# Patient Record
Sex: MALE | Race: WHITE | NOT HISPANIC OR LATINO | Employment: FULL TIME | URBAN - METROPOLITAN AREA
[De-identification: names, ages, dates, MRNs, and addresses within clinical notes are randomized per-mention and may not be internally consistent; named-entity substitution may affect disease eponyms.]

---

## 2024-02-21 ENCOUNTER — LAB (OUTPATIENT)
Dept: LAB | Facility: HOSPITAL | Age: 24
End: 2024-02-21
Payer: MEDICAID

## 2024-02-21 ENCOUNTER — OFFICE VISIT (OUTPATIENT)
Dept: FAMILY MEDICINE CLINIC | Facility: CLINIC | Age: 24
End: 2024-02-21
Payer: MEDICAID

## 2024-02-21 VITALS
TEMPERATURE: 97.6 F | BODY MASS INDEX: 21.92 KG/M2 | WEIGHT: 180 LBS | SYSTOLIC BLOOD PRESSURE: 110 MMHG | DIASTOLIC BLOOD PRESSURE: 72 MMHG | OXYGEN SATURATION: 98 % | HEIGHT: 76 IN | HEART RATE: 53 BPM

## 2024-02-21 DIAGNOSIS — Z23 IMMUNIZATION DUE: ICD-10-CM

## 2024-02-21 DIAGNOSIS — J02.9 ACUTE PHARYNGITIS, UNSPECIFIED ETIOLOGY: ICD-10-CM

## 2024-02-21 DIAGNOSIS — J02.9 ACUTE PHARYNGITIS, UNSPECIFIED ETIOLOGY: Primary | ICD-10-CM

## 2024-02-21 LAB
DEPRECATED RDW RBC AUTO: 42.9 FL (ref 37–54)
ERYTHROCYTE [DISTWIDTH] IN BLOOD BY AUTOMATED COUNT: 13.2 % (ref 12.3–15.4)
HCT VFR BLD AUTO: 45.6 % (ref 37.5–51)
HGB BLD-MCNC: 15.7 G/DL (ref 13–17.7)
MCH RBC QN AUTO: 30.5 PG (ref 26.6–33)
MCHC RBC AUTO-ENTMCNC: 34.4 G/DL (ref 31.5–35.7)
MCV RBC AUTO: 88.7 FL (ref 79–97)
PLATELET # BLD AUTO: 259 10*3/MM3 (ref 140–450)
PMV BLD AUTO: 11.3 FL (ref 6–12)
RBC # BLD AUTO: 5.14 10*6/MM3 (ref 4.14–5.8)
WBC NRBC COR # BLD AUTO: 6.83 10*3/MM3 (ref 3.4–10.8)

## 2024-02-21 PROCEDURE — 99204 OFFICE O/P NEW MOD 45 MIN: CPT

## 2024-02-21 PROCEDURE — 87081 CULTURE SCREEN ONLY: CPT

## 2024-02-21 PROCEDURE — 85027 COMPLETE CBC AUTOMATED: CPT

## 2024-02-21 PROCEDURE — 36415 COLL VENOUS BLD VENIPUNCTURE: CPT

## 2024-02-21 RX ORDER — AMOXICILLIN 875 MG/1
875 TABLET, COATED ORAL 2 TIMES DAILY
Qty: 14 TABLET | Refills: 0 | Status: SHIPPED | OUTPATIENT
Start: 2024-02-21 | End: 2024-02-28

## 2024-02-21 NOTE — PROGRESS NOTES
"     New Patient Office Visit      Date: 2024   Patient Name: Aren Vital  : 2000   MRN: [id number]     Chief Complaint:    Chief Complaint   Patient presents with    Sore Throat     Sores in his throat for one week. Has had strep, covid, RSV, STD testing and has all came back negative this past Friday.      Earache     Earache for one week.       History of Present Illness: Aren Vital is a 23 y.o. male who is here today to establish care.  Patient has no pertinent past medical history.  Patient currently living here in [place] and works for a [employer].  He is currently expecting his first child with his significant other.  Patient states that since last Wednesday he has had a sore throat.  Patient went to the ER this past Saturday and tested negative for COVID, flu, strep, chlamydia, gonorrhea, mono.  Patient was told to treat with supportive measures like salt water lavage and ibuprofen for pain.  It was also recommended to use benzocaine lozenges.  Patient states that originally he had big white dots on the back right side of his throat.  He had his tonsils removed as a child.  He states he has not had a fever, body ache, cough, chills.  Patient states that all the dots have turned into 1 long strand and look like a \"long skinny white patch.\"  Patient states that benzocaine lozenges have helped with this pain.  He does not like to take ibuprofen or Tylenol if he does not need to.  Patient states that he wanted to get checked out again as his symptoms have persisted.  Patient states he is also noticed his ear started to ache in the past 2 days.  He would like it looked at.  He denies any ringing, dizziness.        Patient states he has not had vaccines since he was 11 or 12 years old.  He states he knows he is due for some I would like to discuss the HPV and tetanus boosters today.  He has never received the HPV vaccine before.    Subjective      Review of Systems:   Review of Systems " "  Constitutional:  Negative for chills and fever.   HENT:  Positive for ear pain and sore throat.    Eyes:  Negative for visual disturbance.   Respiratory:  Negative for chest tightness and shortness of breath.    Cardiovascular:  Negative for chest pain and palpitations.   Gastrointestinal:  Negative for abdominal pain.       Past Medical History: History reviewed. No pertinent past medical history.    Past Surgical History: History reviewed. No pertinent surgical history.    Family History: History reviewed. No pertinent family history.    Social History:   Social History     Socioeconomic History    Marital status: Single   Tobacco Use    Smoking status: Never    Smokeless tobacco: Never   Vaping Use    Vaping Use: Every day    Substances: Nicotine   Substance and Sexual Activity    Alcohol use: Yes     Alcohol/week: 1.0 standard drink of alcohol     Types: 1 Glasses of wine per week     Comment: Very rarely    Drug use: Yes     Types: Marijuana       Medications:     Current Outpatient Medications:     benzocaine-menthol (CHLORASEPTIC) 6-10 MG lozenge, Dissolve 1 each in the mouth., Disp: , Rfl:     amoxicillin (AMOXIL) 875 MG tablet, Take 1 tablet by mouth 2 (Two) Times a Day for 7 days., Disp: 14 tablet, Rfl: 0    Allergies:   No Known Allergies    Objective     Physical Exam:  Vital Signs:   Vitals:    02/21/24 1517   BP: 110/72   Pulse: 53   Temp: 97.6 °F (36.4 °C)   TempSrc: Oral   SpO2: 98%   Weight: 81.6 kg (180 lb)   Height: 191.8 cm (75.5\")     Body mass index is 22.2 kg/m².  BMI is within normal parameters. No other follow-up for BMI required.       Physical Exam  Vitals reviewed.   Constitutional:       Appearance: Normal appearance.   HENT:      Head: Normocephalic and atraumatic.      Right Ear: Ear canal and external ear normal.      Left Ear: Ear canal and external ear normal.      Nose: Nose normal.      Mouth/Throat:      Mouth: Mucous membranes are moist.      Pharynx: Uvula midline. " Oropharyngeal exudate and posterior oropharyngeal erythema present. No pharyngeal swelling or uvula swelling.        Comments: Erythema and 1 thin, long white patch on right posterior throat.  No tonsils present.  No swelling.    Eyes:      Conjunctiva/sclera: Conjunctivae normal.      Pupils: Pupils are equal, round, and reactive to light.   Cardiovascular:      Rate and Rhythm: Normal rate and regular rhythm.      Pulses: Normal pulses.      Heart sounds: Normal heart sounds.   Pulmonary:      Effort: Pulmonary effort is normal.      Breath sounds: Normal breath sounds.   Abdominal:      General: Abdomen is flat. Bowel sounds are normal.   Lymphadenopathy:      Cervical: No cervical adenopathy.   Skin:     General: Skin is warm.   Neurological:      General: No focal deficit present.      Mental Status: He is alert and oriented to person, place, and time.   Psychiatric:         Mood and Affect: Mood normal.         Results:   PHQ-9 Total Score: 0          Assessment / Plan      Assessment/Plan:   Diagnoses and all orders for this visit:    1. Acute pharyngitis, unspecified etiology (Primary)  Assessment & Plan:  With symptoms persisting for 1 week would like to start amoxicillin.  Have ordered throat culture.  Will notify patient of results.  If your symptoms worsen or persist and do not improve would recommend returning to clinic.  He verbalized understanding and agreed to this plan.    Orders:  -     Beta Strep Culture, Throat - Swab, Throat; Future  -     Beta Strep Culture, Throat - Swab, Throat  -     amoxicillin (AMOXIL) 875 MG tablet; Take 1 tablet by mouth 2 (Two) Times a Day for 7 days.  Dispense: 14 tablet; Refill: 0  -     Cancel: CBC (No Diff); Future  -     Cancel: Comprehensive Metabolic Panel; Future  -     CBC w MANUAL Differential; Future    2. Immunization due  Assessment & Plan:  Discussed vaccines patient is due for.  Since patient is sick he would like to come back next week sometime to  receive his HPV and tetanus vaccines           Follow Up:   Return if symptoms worsen or fail to improve.    Liza Smith PA-C   [facility]

## 2024-02-21 NOTE — ASSESSMENT & PLAN NOTE
Discussed vaccines patient is due for.  Since patient is sick he would like to come back next week sometime to receive his HPV and tetanus vaccines

## 2024-02-21 NOTE — ASSESSMENT & PLAN NOTE
With symptoms persisting for 1 week would like to start amoxicillin.  Have ordered throat culture.  Will notify patient of results.  If your symptoms worsen or persist and do not improve would recommend returning to clinic.  He verbalized understanding and agreed to this plan.

## 2024-02-22 DIAGNOSIS — J02.9 ACUTE PHARYNGITIS, UNSPECIFIED ETIOLOGY: Primary | ICD-10-CM

## 2024-02-22 LAB
BASOPHILS # BLD MANUAL: 0.07 10*3/MM3 (ref 0–0.2)
BASOPHILS NFR BLD MANUAL: 1 % (ref 0–1.5)
BURR CELLS BLD QL SMEAR: ABNORMAL
DACRYOCYTES BLD QL SMEAR: ABNORMAL
EOSINOPHIL # BLD MANUAL: 0.2 10*3/MM3 (ref 0–0.4)
EOSINOPHIL NFR BLD MANUAL: 3 % (ref 0.3–6.2)
LYMPHOCYTES # BLD MANUAL: 3.35 10*3/MM3 (ref 0.7–3.1)
LYMPHOCYTES NFR BLD MANUAL: 5 % (ref 5–12)
MONOCYTES # BLD: 0.34 10*3/MM3 (ref 0.1–0.9)
NEUTROPHILS # BLD AUTO: 2.87 10*3/MM3 (ref 1.7–7)
NEUTROPHILS NFR BLD MANUAL: 42 % (ref 42.7–76)
POIKILOCYTOSIS BLD QL SMEAR: ABNORMAL
SMALL PLATELETS BLD QL SMEAR: ADEQUATE
TARGETS BLD QL SMEAR: ABNORMAL
VARIANT LYMPHS NFR BLD MANUAL: 49 % (ref 19.6–45.3)
WBC MORPH BLD: NORMAL

## 2024-02-23 ENCOUNTER — TELEPHONE (OUTPATIENT)
Dept: FAMILY MEDICINE CLINIC | Facility: CLINIC | Age: 24
End: 2024-02-23
Payer: COMMERCIAL

## 2024-02-23 LAB — BACTERIA SPEC AEROBE CULT: NORMAL

## 2024-02-23 NOTE — TELEPHONE ENCOUNTER
Discussed with patient and his test results.  Told him that he likely has a viral illness since his strep throat culture came back negative.  Patient states he is feeling a little bit better, but has not looked at the white patch on his throat if it has gotten smaller.  He told patient that if he continues to have symptoms into next week likely will refer to ENT.  Patient can discontinue amoxicillin at this point.

## 2024-02-23 NOTE — TELEPHONE ENCOUNTER
Name: Aren Vital    Relationship: Self    Best Callback Number: [phone]     HUB PROVIDED THE RELAY MESSAGE FROM THE OFFICE   PATIENT HAS FURTHER QUESTIONS AND WOULD LIKE A CALL BACK AT THE FOLLOWING PHONE NUMBER [phone]    ADDITIONAL INFORMATION:   ASKING WHAT HE CAN DO TO MANAGE THIS. PLEASE ADVISE

## 2024-02-23 NOTE — TELEPHONE ENCOUNTER
----- Message from Liza Smith PA-C sent at 2/23/2024  1:33 PM EST -----    Your throat culture is still negative for strep.  You can stop taking the antibiotic.  This is very likely a viral illness as the ER had originally diagnosed you with.  Continue to do supportive measures at home.  Let me know if your symptoms worsen or do not improve in the next week or so.    Unable to reach Aren Vital and/or leave message to return my call.    Please relay message and document.